# Patient Record
Sex: MALE | Race: WHITE | Employment: OTHER | URBAN - METROPOLITAN AREA
[De-identification: names, ages, dates, MRNs, and addresses within clinical notes are randomized per-mention and may not be internally consistent; named-entity substitution may affect disease eponyms.]

---

## 2017-10-10 ENCOUNTER — HOSPITAL ENCOUNTER (EMERGENCY)
Facility: HOSPITAL | Age: 67
Discharge: HOME | End: 2017-10-10
Attending: EMERGENCY MEDICINE
Payer: MEDICARE

## 2017-10-10 ENCOUNTER — APPOINTMENT (OUTPATIENT)
Dept: GENERAL RADIOLOGY | Facility: HOSPITAL | Age: 67
End: 2017-10-10
Attending: EMERGENCY MEDICINE
Payer: MEDICARE

## 2017-10-10 VITALS
BODY MASS INDEX: 20.4 KG/M2 | SYSTOLIC BLOOD PRESSURE: 138 MMHG | DIASTOLIC BLOOD PRESSURE: 87 MMHG | OXYGEN SATURATION: 96 % | TEMPERATURE: 98 F | HEIGHT: 67 IN | RESPIRATION RATE: 18 BRPM | HEART RATE: 88 BPM | WEIGHT: 130 LBS

## 2017-10-10 DIAGNOSIS — K94.23 GASTROSTOMY MALFUNCTION (HCC): Primary | ICD-10-CM

## 2017-10-10 PROCEDURE — 99283 EMERGENCY DEPT VISIT LOW MDM: CPT

## 2017-10-10 PROCEDURE — 43760 HC REPLACE GASTROSTOMY TUBE WO IMAGING NO REVISION: CPT

## 2017-10-10 PROCEDURE — 49465 FLUORO EXAM OF G/COLON TUBE: CPT | Performed by: EMERGENCY MEDICINE

## 2017-10-10 RX ORDER — DOXYCYCLINE 25 MG/5ML
100 POWDER, FOR SUSPENSION ORAL ONCE
Status: COMPLETED | OUTPATIENT
Start: 2017-10-10 | End: 2017-10-10

## 2017-10-10 NOTE — ED PROVIDER NOTES
Patient Seen in: [facility] AND [facility] Emergency Department    History   Patient presents with:  Cath Tube Problem (gastrointestinal, urinary, integumentary)    Stated Complaint: g-tube fell out     HPI    77 yo M with PMH oral cancer s/p palate removal, lung Normocephalic. Pulmonary/Chest: Effort normal.   Abdominal: Soft. Nontender. PEG with mild purulence to stoma without crepitant/cellulitic change. Musculoskeletal: No gross deformity. Neurological: Alert. Skin: Skin is warm.    Psychiatric: Cooperativ without complication with mild purulence noted. XR pending, will initiate doxycycline with anticipation of DC home.     We recommend that you schedule follow up care with a primary care provider within the next three months to obtain basic health screening

## 2017-10-10 NOTE — ED INITIAL ASSESSMENT (HPI)
Pt presents to ED with a c/o g-tube being dislodged. Pt states g-tube was pulled and leaking. G-tub present at this time, small amount of discharge noted around insertion site. Pt undergoing radiation for \"oral\" cancer.

## (undated) NOTE — ED AVS SNAPSHOT
Gonzalez Bond   MRN: [id number]    Department:  [facility] Emergency Department   Date of Visit:  10/10/2017           Disclosure     Insurance plans vary and the physician(s) referred by the ER may not be covered by your plan.  Please contact CARE PHYSICIAN AT ONCE OR RETURN IMMEDIATELY TO THE EMERGENCY DEPARTMENT. If you have been prescribed any medication(s), please fill your prescription right away and begin taking the medication(s) as directed.   If you believe that any of the medications